# Patient Record
Sex: FEMALE | Race: WHITE | NOT HISPANIC OR LATINO | URBAN - METROPOLITAN AREA
[De-identification: names, ages, dates, MRNs, and addresses within clinical notes are randomized per-mention and may not be internally consistent; named-entity substitution may affect disease eponyms.]

---

## 2021-02-11 ENCOUNTER — IMMUNIZATION (OUTPATIENT)
Dept: PRIMARY CARE CLINIC | Facility: CLINIC | Age: 86
End: 2021-02-11

## 2021-02-11 DIAGNOSIS — Z23 NEED FOR VACCINATION: Primary | ICD-10-CM

## 2021-03-03 ENCOUNTER — OFFICE VISIT (OUTPATIENT)
Dept: URGENT CARE | Facility: CLINIC | Age: 86
End: 2021-03-03
Payer: MEDICARE

## 2021-03-03 ENCOUNTER — TELEPHONE (OUTPATIENT)
Dept: ORTHOPEDICS | Facility: CLINIC | Age: 86
End: 2021-03-03

## 2021-03-03 VITALS
DIASTOLIC BLOOD PRESSURE: 77 MMHG | HEART RATE: 72 BPM | BODY MASS INDEX: 23.92 KG/M2 | TEMPERATURE: 99 F | HEIGHT: 62 IN | SYSTOLIC BLOOD PRESSURE: 159 MMHG | OXYGEN SATURATION: 95 % | WEIGHT: 130 LBS | RESPIRATION RATE: 18 BRPM

## 2021-03-03 DIAGNOSIS — S62.101A CLOSED FRACTURE OF RIGHT WRIST, INITIAL ENCOUNTER: Primary | ICD-10-CM

## 2021-03-03 DIAGNOSIS — S69.91XA WRIST INJURY, RIGHT, INITIAL ENCOUNTER: ICD-10-CM

## 2021-03-03 PROCEDURE — 73110 X-RAY EXAM OF WRIST: CPT | Mod: RT,S$GLB,, | Performed by: RADIOLOGY

## 2021-03-03 PROCEDURE — 99202 PR OFFICE/OUTPT VISIT, NEW, LEVL II, 15-29 MIN: ICD-10-PCS | Mod: S$GLB,,, | Performed by: NURSE PRACTITIONER

## 2021-03-03 PROCEDURE — 73110 XR WRIST COMPLETE 3 VIEWS RIGHT: ICD-10-PCS | Mod: RT,S$GLB,, | Performed by: RADIOLOGY

## 2021-03-03 PROCEDURE — 99202 OFFICE O/P NEW SF 15 MIN: CPT | Mod: S$GLB,,, | Performed by: NURSE PRACTITIONER

## 2021-03-03 RX ORDER — PAROXETINE HYDROCHLORIDE 20 MG/1
20 TABLET, FILM COATED ORAL EVERY OTHER DAY
COMMUNITY
Start: 2020-12-01

## 2021-03-03 RX ORDER — SIMVASTATIN 40 MG/1
40 TABLET, FILM COATED ORAL NIGHTLY
COMMUNITY
Start: 2020-12-01

## 2021-03-03 RX ORDER — LATANOPROST 50 UG/ML
1 SOLUTION/ DROPS OPHTHALMIC NIGHTLY
COMMUNITY
Start: 2021-01-21

## 2021-03-08 ENCOUNTER — TELEPHONE (OUTPATIENT)
Dept: ORTHOPEDICS | Facility: CLINIC | Age: 86
End: 2021-03-08

## 2021-03-11 ENCOUNTER — IMMUNIZATION (OUTPATIENT)
Dept: PRIMARY CARE CLINIC | Facility: CLINIC | Age: 86
End: 2021-03-11
Payer: MEDICARE

## 2021-03-11 DIAGNOSIS — Z23 NEED FOR VACCINATION: Primary | ICD-10-CM

## 2021-03-11 PROCEDURE — 0012A COVID-19, MRNA, LNP-S, PF, 100 MCG/0.5 ML DOSE VACCINE: CPT | Mod: PBBFAC | Performed by: EMERGENCY MEDICINE

## 2021-03-25 ENCOUNTER — TELEPHONE (OUTPATIENT)
Dept: ORTHOPEDICS | Facility: CLINIC | Age: 86
End: 2021-03-25

## 2024-06-07 ENCOUNTER — OFFICE VISIT (OUTPATIENT)
Dept: PRIMARY CARE CLINIC | Facility: CLINIC | Age: 89
End: 2024-06-07
Payer: MEDICARE

## 2024-06-07 VITALS
OXYGEN SATURATION: 95 % | WEIGHT: 132.81 LBS | SYSTOLIC BLOOD PRESSURE: 142 MMHG | DIASTOLIC BLOOD PRESSURE: 78 MMHG | HEART RATE: 62 BPM | HEIGHT: 62 IN | BODY MASS INDEX: 24.44 KG/M2

## 2024-06-07 DIAGNOSIS — Z00.00 HEALTHCARE MAINTENANCE: ICD-10-CM

## 2024-06-07 DIAGNOSIS — M54.9 UPPER BACK PAIN: ICD-10-CM

## 2024-06-07 DIAGNOSIS — Z00.00 ANNUAL PHYSICAL EXAM: Primary | ICD-10-CM

## 2024-06-07 DIAGNOSIS — M81.0 AGE-RELATED OSTEOPOROSIS WITHOUT CURRENT PATHOLOGICAL FRACTURE: ICD-10-CM

## 2024-06-07 DIAGNOSIS — R41.3 MEMORY CHANGE: ICD-10-CM

## 2024-06-07 DIAGNOSIS — R41.3 OTHER AMNESIA: ICD-10-CM

## 2024-06-07 DIAGNOSIS — I10 HYPERTENSION, UNSPECIFIED TYPE: ICD-10-CM

## 2024-06-07 DIAGNOSIS — M54.40 CHRONIC LOW BACK PAIN WITH SCIATICA, SCIATICA LATERALITY UNSPECIFIED, UNSPECIFIED BACK PAIN LATERALITY: ICD-10-CM

## 2024-06-07 DIAGNOSIS — E78.5 HYPERLIPIDEMIA, UNSPECIFIED HYPERLIPIDEMIA TYPE: ICD-10-CM

## 2024-06-07 DIAGNOSIS — G89.29 CHRONIC LOW BACK PAIN WITH SCIATICA, SCIATICA LATERALITY UNSPECIFIED, UNSPECIFIED BACK PAIN LATERALITY: ICD-10-CM

## 2024-06-07 DIAGNOSIS — R79.9 ABNORMAL BLOOD CHEMISTRY: ICD-10-CM

## 2024-06-07 DIAGNOSIS — F32.A DEPRESSION, UNSPECIFIED DEPRESSION TYPE: ICD-10-CM

## 2024-06-07 PROCEDURE — 99205 OFFICE O/P NEW HI 60 MIN: CPT | Mod: S$GLB,,, | Performed by: INTERNAL MEDICINE

## 2024-06-07 PROCEDURE — 1159F MED LIST DOCD IN RCRD: CPT | Mod: CPTII,S$GLB,, | Performed by: INTERNAL MEDICINE

## 2024-06-07 PROCEDURE — 1160F RVW MEDS BY RX/DR IN RCRD: CPT | Mod: CPTII,S$GLB,, | Performed by: INTERNAL MEDICINE

## 2024-06-07 PROCEDURE — 1101F PT FALLS ASSESS-DOCD LE1/YR: CPT | Mod: CPTII,S$GLB,, | Performed by: INTERNAL MEDICINE

## 2024-06-07 PROCEDURE — 99999 PR PBB SHADOW E&M-NEW PATIENT-LVL V: CPT | Mod: PBBFAC,,, | Performed by: INTERNAL MEDICINE

## 2024-06-07 PROCEDURE — 3288F FALL RISK ASSESSMENT DOCD: CPT | Mod: CPTII,S$GLB,, | Performed by: INTERNAL MEDICINE

## 2024-06-07 RX ORDER — TIMOLOL MALEATE 6.8 MG/ML
SOLUTION/ DROPS OPHTHALMIC
COMMUNITY

## 2024-06-07 RX ORDER — MELOXICAM 7.5 MG/1
7.5 TABLET ORAL DAILY PRN
Qty: 30 TABLET | Refills: 0 | Status: SHIPPED | OUTPATIENT
Start: 2024-06-07

## 2024-06-07 RX ORDER — CICLOPIROX 80 MG/ML
SOLUTION TOPICAL NIGHTLY
Qty: 6 ML | Refills: 6 | Status: SHIPPED | OUTPATIENT
Start: 2024-06-07

## 2024-06-07 RX ORDER — GABAPENTIN 100 MG/1
200 CAPSULE ORAL NIGHTLY
COMMUNITY
Start: 2024-04-15

## 2024-06-07 RX ORDER — ALENDRONATE SODIUM 70 MG/1
70 TABLET ORAL
COMMUNITY

## 2024-06-07 RX ORDER — OLMESARTAN MEDOXOMIL 5 MG/1
5 TABLET ORAL DAILY
Qty: 30 TABLET | Refills: 3 | Status: SHIPPED | OUTPATIENT
Start: 2024-06-07 | End: 2025-06-07

## 2024-06-07 RX ORDER — CHOLECALCIFEROL (VITAMIN D3) 25 MCG
1000 TABLET ORAL DAILY
COMMUNITY

## 2024-06-07 RX ORDER — LYSINE HCL 500 MG
TABLET ORAL
COMMUNITY

## 2024-06-07 RX ORDER — ESCITALOPRAM OXALATE 10 MG/1
TABLET ORAL
COMMUNITY

## 2024-06-07 NOTE — PROGRESS NOTES
Subjective:       Patient ID: Max Julien is a 89 y.o. female.    Chief Complaint: No chief complaint on file.      HPI  Max Julien is a 89 y.o. female with hyperlipidemia, scoliosis, osteoporosis, back pain, shingles, depression who presents today for Establish Care and Hypertension    Reports has been noted with mild increased blood pressure.  Recently she has been seen by a friend therapist for her back pain and noted a systolic blood pressure of the 180 on right arm and 166 on the left arm.  Denies headaches, lightheadedness, chest pains, or palpitations.  Did not take anti-inflammatories before blood pressure.  Has never been treated with blood pressure medications.    Has been having back pains.  Diagnose with osteoporosis and treated with Fosamax.  On 2021 she had a fall, later presenting back pain.  She was evaluated and found with shingles at that time.  For her back pain has been taking gabapentin as needed and is not clear if is helping.  Has received physical therapy on an.  Tries not to take any other medications.  Pain is worse with certain activities, better when she is sitting.  Has tried occasional ibuprofen or naproxen with some help. Tends to have leg tingling on her left leg.  Has had no recent falls.  She uses a cane to ambulate when she needs the house but walks independently at home, maybe holding to furniture.    Patient and family have been noticing some changes in memory. Tends to repeat and forgets recent memories.  There is no new weakness, trouble speaking, headaches, or dizziness (but sometimes loses her balance).  Has noticed some slow decreased vision changes and she has due for an eye exam.  Also having hearing loss and needs hearing aids that she has currently not using and will need adjustment.     Noted treatment with Lexapro with past history of depression but now stable.    Has no toxic habits.  She is  with grown kids.  Still drives short  "distances.    Health Maintenance:  Health Maintenance   Topic Date Due    Lipid Panel  Never done    Shingles Vaccine (1 of 2) Never done    TETANUS VACCINE  2025       Review of Systems   Constitutional: Negative.  Negative for fever and unexpected weight change.   HENT:  Positive for hearing loss.    Eyes:  Positive for visual disturbance.   Respiratory: Negative.     Cardiovascular: Negative.    Gastrointestinal:  Positive for constipation.   Genitourinary:  Negative for difficulty urinating.   Musculoskeletal: Negative.    Integumentary:  Negative.   Neurological: Negative.  Positive for memory loss.   Psychiatric/Behavioral: Negative.        Past Medical History:   Diagnosis Date    Constipation     Depression     Hyperlipidemia     Hypertension 2024    Osteoporosis     Shingles        Past Surgical History:   Procedure Laterality Date    APPENDECTOMY       SECTION         Family History   Problem Relation Name Age of Onset    Osteoarthritis Mother      Heart disease Mother      Cancer Father  58    Stroke Sister 2        Social History     Socioeconomic History    Marital status:    Tobacco Use    Smoking status: Never   Substance and Sexual Activity    Alcohol use: Never       Current Outpatient Medications   Medication Sig Dispense Refill    latanoprost 0.005 % ophthalmic solution Place 1 drop into both eyes nightly.      paroxetine (PAXIL) 20 MG tablet Take 20 mg by mouth every other day.      simvastatin (ZOCOR) 40 MG tablet Take 40 mg by mouth nightly.       No current facility-administered medications for this visit.       Review of patient's allergies indicates:   Allergen Reactions    Sulfa (sulfonamide antibiotics)          Objective:       Last 3 sets of Vitals        3/3/2021     1:43 PM   Vitals - 1 value per visit   SYSTOLIC 159   DIASTOLIC 77   Pulse 72   Temp 98.6 °F (37 °C)   Resp 18   SPO2 95 %   Weight (lb) 130   Weight (kg) 58.968   Height 5' 2" (1.575 m)   BMI " (Calculated) 23.8   Physical Exam  Constitutional:       General: She is not in acute distress.  HENT:      Head: Normocephalic.      Right Ear: Tympanic membrane and ear canal normal. There is impacted cerumen.      Left Ear: Tympanic membrane, ear canal and external ear normal.      Nose: Nose normal.      Mouth/Throat:      Mouth: Mucous membranes are moist.   Eyes:      General: No scleral icterus.     Extraocular Movements: Extraocular movements intact.      Conjunctiva/sclera: Conjunctivae normal.   Neck:      Vascular: No carotid bruit.      Comments: No goiter.  Cardiovascular:      Rate and Rhythm: Normal rate and regular rhythm.      Pulses: Normal pulses.      Heart sounds: Normal heart sounds.   Pulmonary:      Effort: Pulmonary effort is normal.      Breath sounds: Normal breath sounds.   Abdominal:      General: Bowel sounds are normal. There is no distension.      Palpations: Abdomen is soft. There is no mass.      Tenderness: There is no abdominal tenderness.   Musculoskeletal:         General: No swelling or tenderness.      Comments: Spine deviation.  Negative straight leg but some discomfort with external hip rotation.   Lymphadenopathy:      Cervical: No cervical adenopathy.   Skin:     General: Skin is warm and dry.   Neurological:      General: No focal deficit present.      Mental Status: She is alert and oriented to person, place, and time.      Motor: No weakness.      Comments: Answers questions appropriately with vague information of prior medical care.   Psychiatric:         Mood and Affect: Mood normal.         Behavior: Behavior normal.           CBC:      CMP:      URINALYSIS:       LIPIDS:      TSH:        A1C:        Imaging:  X-Ray Wrist Complete 3 views Right  Narrative: EXAMINATION:  XR WRIST COMPLETE 3 VIEWS RIGHT    CLINICAL HISTORY:  Unspecified injury of right wrist, hand and finger(s), initial encounter    TECHNIQUE:  PA, lateral, and oblique views of the right wrist were  performed.    COMPARISON:  None    FINDINGS:  There is a nondisplaced distal radius fracture identified.  Nondisplaced fracture involving the styloid process of the ulna also noted.  Mild DJD.  Impression: See above    Electronically signed by: Allen Degroot MD  Date:    03/03/2021  Time:    13:56      Assessment:       1. Annual physical exam    2. Hyperlipidemia, unspecified hyperlipidemia type    3. Hypertension, unspecified type    4. Age-related osteoporosis without current pathological fracture    5. Chronic low back pain with sciatica, sciatica laterality unspecified, unspecified back pain laterality    6. Upper back pain    7. Memory change    8. Other amnesia    9. Depression, unspecified depression type    10. Abnormal blood chemistry    11. Healthcare maintenance            Plan:       1. Annual physical exam  -     CBC Auto Differential; Future; Expected date: 06/07/2024  -     Comprehensive Metabolic Panel; Future; Expected date: 06/07/2024  -     Hemoglobin A1C; Future; Expected date: 06/07/2024  -     Lipid Panel; Future; Expected date: 06/07/2024  -     TSH; Future; Expected date: 06/07/2024    2. Hyperlipidemia, unspecified hyperlipidemia type  Assessment & Plan:  Noted history of high cholesterol but no medications.  Follow-up labs.  Healthy diet and exercise as tolerated recommended.  Does not smoke and has a healthy weight.    Orders:  -     Comprehensive Metabolic Panel; Future; Expected date: 06/07/2024  -     Lipid Panel; Future; Expected date: 06/07/2024    3. Hypertension, unspecified type  Assessment & Plan:  Noticed several readings of elevated blood pressure, currently mild hypertension.  Will start low-dose and monitor.    Orders:  -     CBC Auto Differential; Future; Expected date: 06/07/2024  -     TSH; Future; Expected date: 06/07/2024  -     olmesartan (BENICAR) 5 MG Tab; Take 1 tablet (5 mg total) by mouth once daily.  Dispense: 30 tablet; Refill: 3    4. Age-related osteoporosis  without current pathological fracture  Assessment & Plan:  Previous bone density test are not available.  Will request copy of the chart and continue present treatment.  Appears to have been on Fosamax since 2021.    Orders:  -     X-Ray Lumbar Spine 2 Or 3 Views; Future; Expected date: 06/07/2024  -     XR THORACIC SPINE AP LATERAL & SWIMMERS; Future; Expected date: 06/07/2024    5. Chronic low back pain with sciatica, sciatica laterality unspecified, unspecified back pain laterality  Assessment & Plan:  Noticed scoliosis, appears to have component of sciatica.  She is on gabapentin as needed and probably not seeing full effect of the medication.  Will recommend to take gabapentin scheduled at night, we will try meloxicam, and order therapy.  X-ray has been ordered and if persist with paresthesia or there is weakness, MRI would be considered and pain management evaluation.  We will also consider changing Lexapro to Cymbalta.    Orders:  -     meloxicam (MOBIC) 7.5 MG tablet; Take 1 tablet (7.5 mg total) by mouth daily as needed for Pain.  Dispense: 30 tablet; Refill: 0  -     Ambulatory referral/consult to Physical/Occupational Therapy; Future; Expected date: 06/14/2024  -     X-Ray Lumbar Spine 2 Or 3 Views; Future; Expected date: 06/07/2024  -     X-Ray Hips Bilateral 2 View Incl AP Pelvis; Future; Expected date: 06/08/2024    6. Upper back pain  Assessment & Plan:  Noted with scoliosis.  Spine is not tender.  Will order physical therapy and give trial of meloxicam.    Orders:  -     meloxicam (MOBIC) 7.5 MG tablet; Take 1 tablet (7.5 mg total) by mouth daily as needed for Pain.  Dispense: 30 tablet; Refill: 0  -     Ambulatory referral/consult to Physical/Occupational Therapy; Future; Expected date: 06/14/2024  -     XR THORACIC SPINE AP LATERAL & SWIMMERS; Future; Expected date: 06/07/2024    7. Memory change  Assessment & Plan:  Oriented and answers questions appropriately.  Noted some hesitance or not  remembering part of her medical care that could be more due to knowledge than memory.  Neuropsychologist evaluation recommended.  Hearing loss and depression sometimes could be associated to concerns of memory loss.    Could consider starting donepezil once her blood pressure is controlled.      Workup will be ordered, including head CT scan.    Orders:  -     TSH; Future; Expected date: 06/07/2024  -     Ambulatory referral/consult to Neuropsychology; Future; Expected date: 06/14/2024  -     Vitamin B12; Future; Expected date: 06/07/2024  -     Vitamin B1; Future; Expected date: 06/07/2024  -     Folate; Future; Expected date: 06/07/2024  -     RPR; Future; Expected date: 06/07/2024  -     Urinalysis, Reflex to Urine Culture Urine, Clean Catch  -     Ambulatory referral/consult to Adult Neuropsychology; Future; Expected date: 06/07/2024  -     CT Head Without Contrast; Future; Expected date: 06/08/2024    8. Other amnesia  -     CT Head Without Contrast; Future; Expected date: 06/08/2024    9. Depression, unspecified depression type  Assessment & Plan:  Past history of depression.  Will appears stable.  Will continue present treatment.  Could consider changing to Cymbalta for chronic pain chronic.      10. Abnormal blood chemistry  -     Hemoglobin A1C; Future; Expected date: 06/07/2024    11. Healthcare maintenance  Assessment & Plan:  - Yearly labs- ordered today.  - Colon cancer screening- will request old chart but observation recommended after 80 years old.  - ACP- information given for next visit.  - Eye exam- she is due for exam.  - DEXA-not available  - Vaccination- request old chart.    Orders:  -     CBC Auto Differential; Future; Expected date: 06/07/2024  -     Comprehensive Metabolic Panel; Future; Expected date: 06/07/2024  -     Hemoglobin A1C; Future; Expected date: 06/07/2024  -     Lipid Panel; Future; Expected date: 06/07/2024  -     TSH; Future; Expected date: 06/07/2024    Other orders  -      ciclopirox (PENLAC) 8 % Soln; Apply topically nightly.  Dispense: 6 mL; Refill: 6  -     carbamide peroxide (DEBROX) 6.5 % otic solution; Place 5 drops into the right ear as needed (wax).  Dispense: 15 mL; Refill: 0       Health Maintenance Due   Topic Date Due    Lipid Panel  Never done    Shingles Vaccine (1 of 2) Never done    RSV Vaccine (Age 60+ and Pregnant patients) (1 - 1-dose 60+ series) Never done    Pneumococcal Vaccines (Age 65+) (2 of 2 - PCV) 08/08/2012    COVID-19 Vaccine (3 - 2023-24 season) 09/01/2023        I spent a total of 50 minutes on the day of the visit.This includes face to face time and non-face to face time preparing to see the patient (eg, review of tests), obtaining and/or reviewing separately obtained history, documenting clinical information in the electronic or other health record, independently interpreting results and communicating results to the patient/family/caregiver, or care coordinator.       Return to clinic in 0.5 months.  WS 3    Elina Oliveira MD  [facility]  Disclaimer:  This note has been generated using voice-recognition software. There may be grammatical or spelling errors that have been missed during proof-reading

## 2024-06-07 NOTE — Clinical Note
Please schedule head CT scan and hip x-rays with other test ordered.  Also, please request copy of old chart, previous PCP.

## 2024-06-08 PROBLEM — I10 HYPERTENSION: Status: ACTIVE | Noted: 2024-06-08

## 2024-06-08 PROBLEM — M54.40 CHRONIC LOW BACK PAIN WITH SCIATICA: Status: ACTIVE | Noted: 2024-06-08

## 2024-06-08 PROBLEM — R41.3 MEMORY CHANGE: Status: ACTIVE | Noted: 2024-06-08

## 2024-06-08 PROBLEM — M54.9 UPPER BACK PAIN: Status: ACTIVE | Noted: 2024-06-08

## 2024-06-08 PROBLEM — F32.A DEPRESSION: Status: ACTIVE | Noted: 2024-06-08

## 2024-06-08 PROBLEM — G89.29 CHRONIC LOW BACK PAIN WITH SCIATICA: Status: ACTIVE | Noted: 2024-06-08

## 2024-06-08 PROBLEM — Z00.00 HEALTHCARE MAINTENANCE: Status: ACTIVE | Noted: 2024-06-08

## 2024-06-08 NOTE — ASSESSMENT & PLAN NOTE
Previous bone density test are not available.  Will request copy of the chart and continue present treatment.  Appears to have been on Fosamax since 2021.

## 2024-06-08 NOTE — ASSESSMENT & PLAN NOTE
Noted with scoliosis.  Spine is not tender.  Will order physical therapy and give trial of meloxicam.

## 2024-06-08 NOTE — ASSESSMENT & PLAN NOTE
Noticed scoliosis, appears to have component of sciatica.  She is on gabapentin as needed and probably not seeing full effect of the medication.  Will recommend to take gabapentin scheduled at night, we will try meloxicam, and order therapy.  X-ray has been ordered and if persist with paresthesia or there is weakness, MRI would be considered and pain management evaluation.  We will also consider changing Lexapro to Cymbalta.

## 2024-06-08 NOTE — ASSESSMENT & PLAN NOTE
Oriented and answers questions appropriately.  Noted some hesitance or not remembering part of her medical care that could be more due to knowledge than memory.  Neuropsychologist evaluation recommended.  Hearing loss and depression sometimes could be associated to concerns of memory loss.    Could consider starting donepezil once her blood pressure is controlled.      Workup will be ordered, including head CT scan.

## 2024-06-08 NOTE — ASSESSMENT & PLAN NOTE
Noted history of high cholesterol but no medications.  Follow-up labs.  Healthy diet and exercise as tolerated recommended.  Does not smoke and has a healthy weight.

## 2024-06-08 NOTE — ASSESSMENT & PLAN NOTE
Past history of depression.  Will appears stable.  Will continue present treatment.  Could consider changing to Cymbalta for chronic pain chronic.

## 2024-06-08 NOTE — ASSESSMENT & PLAN NOTE
Noticed several readings of elevated blood pressure, currently mild hypertension.  Will start low-dose and monitor.

## 2024-06-08 NOTE — ASSESSMENT & PLAN NOTE
- Yearly labs- ordered today.  - Colon cancer screening- will request old chart but observation recommended after 80 years old.  - ACP- information given for next visit.  - Eye exam- she is due for exam.  - DEXA-not available  - Vaccination- request old chart.

## 2024-06-10 ENCOUNTER — PATIENT MESSAGE (OUTPATIENT)
Dept: PRIMARY CARE CLINIC | Facility: CLINIC | Age: 89
End: 2024-06-10
Payer: MEDICARE

## 2024-06-18 ENCOUNTER — TELEPHONE (OUTPATIENT)
Dept: PRIMARY CARE CLINIC | Facility: CLINIC | Age: 89
End: 2024-06-18
Payer: MEDICARE

## 2024-06-18 DIAGNOSIS — G89.29 CHRONIC LOW BACK PAIN WITH SCIATICA, SCIATICA LATERALITY UNSPECIFIED, UNSPECIFIED BACK PAIN LATERALITY: Primary | ICD-10-CM

## 2024-06-18 DIAGNOSIS — M54.40 CHRONIC LOW BACK PAIN WITH SCIATICA, SCIATICA LATERALITY UNSPECIFIED, UNSPECIFIED BACK PAIN LATERALITY: Primary | ICD-10-CM

## 2024-06-18 NOTE — TELEPHONE ENCOUNTER
----- Message from Eileen Albright MA sent at 6/18/2024  4:32 PM CDT -----  Contact: [phone]    ----- Message -----  From: Sharon Hinkle  Sent: 6/18/2024   4:12 PM CDT  To: Roxanne Antoine Staff    Patient states please add and link urinalysis to orders to  labs appt  6/20/24. Please call and advise.    Thank you and have a great day.